# Patient Record
Sex: MALE | Race: OTHER | URBAN - METROPOLITAN AREA
[De-identification: names, ages, dates, MRNs, and addresses within clinical notes are randomized per-mention and may not be internally consistent; named-entity substitution may affect disease eponyms.]

---

## 2018-08-20 ENCOUNTER — OFFICE VISIT (OUTPATIENT)
Dept: FAMILY MEDICINE CLINIC | Age: 48
End: 2018-08-20

## 2018-08-20 ENCOUNTER — HOSPITAL ENCOUNTER (OUTPATIENT)
Dept: LAB | Age: 48
Discharge: HOME | End: 2018-08-20

## 2018-08-20 VITALS
TEMPERATURE: 98 F | DIASTOLIC BLOOD PRESSURE: 78 MMHG | HEIGHT: 69 IN | WEIGHT: 177 LBS | SYSTOLIC BLOOD PRESSURE: 116 MMHG | BODY MASS INDEX: 26.22 KG/M2 | HEART RATE: 61 BPM

## 2018-08-20 DIAGNOSIS — Z00.01 ENCOUNTER FOR GENERAL ADULT MEDICAL EXAMINATION WITH ABNORMAL FINDINGS: ICD-10-CM

## 2018-08-20 DIAGNOSIS — M77.12 LATERAL EPICONDYLITIS OF LEFT ELBOW: ICD-10-CM

## 2018-08-20 DIAGNOSIS — Z00.01 ENCOUNTER FOR GENERAL ADULT MEDICAL EXAMINATION WITH ABNORMAL FINDINGS: Primary | ICD-10-CM

## 2018-08-20 LAB
ALBUMIN SERPL-MCNC: 4.4 G/DL (ref 3.5–5)
ALBUMIN/GLOB SERPL: 1.4 {RATIO} (ref 1.1–2.2)
ALP SERPL-CCNC: 106 U/L (ref 45–117)
ALT SERPL-CCNC: 21 U/L (ref 12–78)
ANION GAP SERPL CALC-SCNC: 8 MMOL/L (ref 5–15)
AST SERPL-CCNC: 22 U/L (ref 15–37)
BILIRUB SERPL-MCNC: 0.4 MG/DL (ref 0.2–1)
BUN SERPL-MCNC: 21 MG/DL (ref 6–20)
BUN/CREAT SERPL: 27 (ref 12–20)
CALCIUM SERPL-MCNC: 9 MG/DL (ref 8.5–10.1)
CHLORIDE SERPL-SCNC: 104 MMOL/L (ref 97–108)
CHOLEST SERPL-MCNC: 190 MG/DL
CO2 SERPL-SCNC: 28 MMOL/L (ref 21–32)
CREAT SERPL-MCNC: 0.79 MG/DL (ref 0.7–1.3)
EST. AVERAGE GLUCOSE BLD GHB EST-MCNC: 114 MG/DL
GLOBULIN SER CALC-MCNC: 3.2 G/DL (ref 2–4)
GLUCOSE SERPL-MCNC: 79 MG/DL (ref 65–100)
HBA1C MFR BLD: 5.6 % (ref 4.2–6.3)
HDLC SERPL-MCNC: 37 MG/DL
HDLC SERPL: 5.1 {RATIO} (ref 0–5)
LDLC SERPL CALC-MCNC: 127.2 MG/DL (ref 0–100)
LIPID PROFILE,FLP: ABNORMAL
POTASSIUM SERPL-SCNC: 4.7 MMOL/L (ref 3.5–5.1)
PROT SERPL-MCNC: 7.6 G/DL (ref 6.4–8.2)
PSA SERPL-MCNC: 0.6 NG/ML (ref 0.01–4)
SODIUM SERPL-SCNC: 140 MMOL/L (ref 136–145)
TRIGL SERPL-MCNC: 129 MG/DL (ref ?–150)
VLDLC SERPL CALC-MCNC: 25.8 MG/DL

## 2018-08-20 PROCEDURE — 80053 COMPREHEN METABOLIC PANEL: CPT | Performed by: FAMILY MEDICINE

## 2018-08-20 PROCEDURE — 80061 LIPID PANEL: CPT | Performed by: FAMILY MEDICINE

## 2018-08-20 PROCEDURE — 85027 COMPLETE CBC AUTOMATED: CPT | Performed by: FAMILY MEDICINE

## 2018-08-20 PROCEDURE — 83036 HEMOGLOBIN GLYCOSYLATED A1C: CPT | Performed by: FAMILY MEDICINE

## 2018-08-20 PROCEDURE — 84153 ASSAY OF PSA TOTAL: CPT | Performed by: FAMILY MEDICINE

## 2018-08-20 RX ORDER — PREDNISONE 10 MG/1
TABLET ORAL
Qty: 21 TAB | Refills: 0 | Status: SHIPPED | OUTPATIENT
Start: 2018-08-20

## 2018-08-20 NOTE — PATIENT INSTRUCTIONS
Codo de tenista: Instrucciones de cuidado - [ Tennis Elbow: Care Instructions ]  Instrucciones de cuidado    El codo de [place] es sensibilidad o dolor en la parte externa del codo. El dolor aparece cuando el tendón se estira y se irrita por el retorcimiento repetitivo de la mano, la silva y el antebrazo. Un tendón es un tejido masha que conecta el músculo al [place]. Esta lesión es común [place] jugadores de tenis. Martinez también pueden producirla muchas actividades en las que se usan los mismos músculos. Entre los ejemplos están la jardinería, la pintura y el uso de herramientas. El codo de [place] suele sanar con descanso y tratamiento en el hogar. La atención de seguimiento es bonnie parte clave de del valle tratamiento y seguridad. Asegúrese de hacer y acudir a todas las citas, y llame a del valle médico si está teniendo problemas. También es bonnie buena idea saber los resultados de kimmie exámenes y mantener bonnie lista de los medicamentos que hugo. ¿Cómo puede cuidarse en el hogar?    · Descanse los dedos, la silva y el antebrazo. Trate de no hacer o de reducir cualquier [employer] cause dolor de codo. Es posible que tenga que descansar del valle brazo florence semanas a meses. Siga las instrucciones de del valle médico sobre cuánto tiempo descansar.     · Colóquese hielo o bonnie compresa fría en el codo florence 10 a 20 minutos cada vez. Trate de hacerlo cada 1 a 2 horas florence los siguientes 3 días (cuando esté despierto) o hasta que la hinchazón baje. Póngase un paño cabrera entre el hielo y la piel.     · Si del valle médico le keke un aparato ortopédico o tablilla (férula), úselo según las indicaciones. Un aparato ortopédico de \"contrafuerza\" es un vendaje alrededor de del valle antebrazo, david debajo del codo.  Puede aliviar la presión [employer] tendón y distribuir la fuerza en todo el brazo.     · Para ayudar a reducir la hinchazón, eleve del valle codo sobre almohadas.     · Siga las instrucciones del médico o el fisioterapeuta con respecto al ejercicio.     · Vuelva poco a poco a kimmie actividades habituales.     · Trate de prevenir el problema. Aprenda las mejores técnicas para del valle deporte. Por ejemplo, asegúrese de que la empuñadura de del valle raqueta de tenis no sea demasiado may para del valle mano. Trate de no golpear la pelota de tenis tarde florence el swing.     · Piense en preguntar a del valle empleador acerca de nuevas maneras de hacer del valle trabajo si el dolor de codo es causado por [facility] [place] [place]. Medicamentos    · Sea christina con los medicamentos. Caitie y siga todas las instrucciones de la [place]. ¨ Si el médico le recetó un analgésico (medicamento para el dolor), tómelo según las indicaciones. ¨ Si no está tomando un analgésico recetado, pregúntele a del valle médico si puede jayashree jillian de [employer]. ¿Cuándo debe pedir ayuda? Llame a del valle médico ahora mismo o busque atención médica inmediata si:    · El dolor empeora.     · No puede doblar el codo normalmente.     · El brazo o la mano están fríos, pálidos o cambian de color.     · Siente hormigueo, debilitamiento o entumecimiento en la mano y los dedos.    Preste especial atención a los cambios en del valle nicole y asegúrese de comunicarse con del valle médico si:    · El dolor de codo le causa problemas en el [place].     · El dolor no mejora después de 2 semanas. ¿Dónde puede encontrar más información en inglés? Elba Moreno a http://verna.info/. Escriba C285 en la búsqueda para aprender más acerca de \"Codo de tenista: Instrucciones de cuidado - [ Tennis Elbow: Care Instructions ]. \"  Revisado: 29 noviembre, 2017  Versión del contenido: 11.7  © 9035-7543 [employer], [employer]. Las instrucciones de cuidado fueron adaptadas bajo licencia por [employer] (which disclaims liability or warranty for this information). Si usted tiene [place] afección médica o sobre estas instrucciones, siempre pregunte a del valle profesional de nicole.  [employer], [employer] niega toda garantía o responsabilidad por del valle uso de esta información.

## 2018-08-20 NOTE — PROGRESS NOTES
Avs discussed with [address] [place] [address] by Discharge Nurse Kimmy Sykes LPN with  Silvio Banks. Discussed medications prescribed today, pt verbalized understanding of the tapering of the medications. Pt advised he will get call regarding lab results, if doesn't hear any thing he can call the number provided to the [facility].    AVS printed and given to patient Kimmy Sykes LPN

## 2018-08-20 NOTE — PROGRESS NOTES
HISTORY OF PRESENT ILLNESS  Parag Chang is a 57 y.o. male. HPI  Patient is having left elbow pain for the past month. It hurst the whole elbow and radiates to the lateral aspect of the elbow and runs down the forearm. So far has been taking ibuprofen 600 mg twice a day. No injury to the elbow. No falls. He is a . Also would like to have blood work today to make sure everything is well. Review of Systems   Constitutional: Negative for chills, diaphoresis, fever, malaise/fatigue and weight loss. HENT: Negative for congestion, ear discharge, ear pain, hearing loss, nosebleeds, sinus pain, sore throat and tinnitus. Eyes: Negative for blurred vision, double vision, photophobia, pain, discharge and redness. Respiratory: Negative for cough, hemoptysis, sputum production, shortness of breath, wheezing and stridor. Cardiovascular: Negative for chest pain, palpitations, orthopnea, claudication, leg swelling and PND. Gastrointestinal: Negative for abdominal pain, diarrhea, heartburn, melena, nausea and vomiting. Genitourinary: Negative for dysuria, flank pain, frequency, hematuria and urgency. Musculoskeletal: Positive for joint pain and myalgias. Negative for back pain, falls and neck pain. Left elbow pain   Skin: Negative for itching and rash. Neurological: Negative for dizziness, tingling, tremors, sensory change, weakness and headaches. History reviewed. No pertinent past medical history. History reviewed. No pertinent surgical history. Social History     Social History    Marital status:      Spouse name: N/A    Number of children: N/A    Years of education: N/A     Social History Main Topics    Smoking status: Never Smoker    Smokeless tobacco: Never Used    Alcohol use Yes      Comment: social    Drug use: No    Sexual activity: Not Asked     Other Topics Concern    None     Social History Narrative    None       History reviewed.  No pertinent family history. Current Outpatient Prescriptions   Medication Sig Dispense Refill    predniSONE (DELTASONE) 10 mg tablet 6 tab po day1, 5 tab, po day 2, 4 tab po day 3, 3 tab po day 4, 2 tab po day 5, 1 tab po day 6 21 Tab 0       No Known Allergies  /78 (BP 1 Location: Right arm, BP Patient Position: Sitting)  Pulse 61  Temp 98 °F (36.7 °C) (Oral)   Ht 5' 8.9\" (1.75 m)  Wt 177 lb (80.3 kg)  BMI 26.22 kg/m2  Physical Exam   Constitutional: He appears well-developed and well-nourished. HENT:   Head: Normocephalic. Mouth/Throat: No oropharyngeal exudate. Eyes: Conjunctivae and EOM are normal. Pupils are equal, round, and reactive to light. Neck: Normal range of motion. Neck supple. No thyromegaly present. Cardiovascular: Normal rate, regular rhythm and normal heart sounds. No murmur heard. Pulmonary/Chest: Effort normal and breath sounds normal. No respiratory distress. He has no wheezes. He has no rales. Abdominal: Soft. Bowel sounds are normal. He exhibits no distension. There is no tenderness. There is no rebound. Musculoskeletal: Normal range of motion. He exhibits tenderness. He exhibits no edema. Tenderness to palpation left lateral epicondyle       ASSESSMENT and PLAN  Diagnoses and all orders for this visit:    1. Encounter for general adult medical examination with abnormal findings  -     METABOLIC PANEL, COMPREHENSIVE; Future  -     CBC W/O DIFF; Future  -     LIPID PANEL; Future  -     HEMOGLOBIN A1C WITH EAG; Future  -     PROSTATE SPECIFIC AG; Future    2.  Lateral epicondylitis of left elbow    Other orders  -     predniSONE (DELTASONE) 10 mg tablet; 6 tab po day1, 5 tab, po day 2, 4 tab po day 3, 3 tab po day 4, 2 tab po day 5, 1 tab po day 6

## 2018-08-21 LAB
ERYTHROCYTE [DISTWIDTH] IN BLOOD BY AUTOMATED COUNT: 13.2 % (ref 11.5–14.5)
HCT VFR BLD AUTO: 50.6 % (ref 36.6–50.3)
HGB BLD-MCNC: 15.9 G/DL (ref 12.1–17)
MCH RBC QN AUTO: 28.3 PG (ref 26–34)
MCHC RBC AUTO-ENTMCNC: 31.4 G/DL (ref 30–36.5)
MCV RBC AUTO: 90.2 FL (ref 80–99)
NRBC # BLD: 0 K/UL (ref 0–0.01)
NRBC BLD-RTO: 0 PER 100 WBC
PLATELET # BLD AUTO: 238 K/UL (ref 150–400)
PMV BLD AUTO: 11.6 FL (ref 8.9–12.9)
RBC # BLD AUTO: 5.61 M/UL (ref 4.1–5.7)
WBC # BLD AUTO: 7.5 K/UL (ref 4.1–11.1)

## 2018-08-22 ENCOUNTER — TELEPHONE (OUTPATIENT)
Dept: FAMILY MEDICINE CLINIC | Age: 48
End: 2018-08-22

## 2018-08-22 NOTE — LETTER
8/23/2018 3:36 PM 
 
Mr. Marni Cohen [address] [place] [address] [phone] Dear Terese Miky Becker, Los analisis de winnie que hicimos cuando usted vino a [place] son normal.  
 
 
 
 
 
 
Sincerely, Michelle Chrsitina MD

## 2018-08-22 NOTE — TELEPHONE ENCOUNTER
Normal blood count, kidney, liver, electrolytes, prostate test in blood, cholesterol. No diabetes.   Patient can be informed

## 2023-02-07 ENCOUNTER — TRANSCRIBE ORDER (OUTPATIENT)
Dept: SCHEDULING | Age: 53
End: 2023-02-07

## 2023-02-07 DIAGNOSIS — R10.9 STOMACH ACHE: Primary | ICD-10-CM

## 2023-02-07 DIAGNOSIS — R10.9 FLANK PAIN: ICD-10-CM

## 2023-02-21 ENCOUNTER — HOSPITAL ENCOUNTER (OUTPATIENT)
Dept: ULTRASOUND IMAGING | Age: 53
Discharge: HOME | End: 2023-02-21
Attending: INTERNAL MEDICINE

## 2023-02-21 DIAGNOSIS — R10.9 FLANK PAIN: ICD-10-CM

## 2023-02-21 PROCEDURE — 76770 US EXAM ABDO BACK WALL COMP: CPT

## 2023-05-20 RX ORDER — PREDNISONE 10 MG/1
TABLET ORAL
COMMUNITY
Start: 2018-08-20

## 2024-02-09 ENCOUNTER — HOSPITAL ENCOUNTER (INPATIENT)
Facility: HOSPITAL | Age: 54
LOS: 1 days | Discharge: HOME | DRG: 390 | End: 2024-02-11
Attending: EMERGENCY MEDICINE | Admitting: FAMILY MEDICINE

## 2024-02-09 ENCOUNTER — APPOINTMENT (OUTPATIENT)
Facility: HOSPITAL | Age: 54
DRG: 390 | End: 2024-02-09

## 2024-02-09 DIAGNOSIS — N20.0 LEFT RENAL STONE: ICD-10-CM

## 2024-02-09 DIAGNOSIS — R80.8 OTHER PROTEINURIA: Primary | ICD-10-CM

## 2024-02-09 DIAGNOSIS — K56.609 SMALL BOWEL OBSTRUCTION (HCC): ICD-10-CM

## 2024-02-09 DIAGNOSIS — K76.0 HEPATIC STEATOSIS: ICD-10-CM

## 2024-02-09 LAB
ALBUMIN SERPL-MCNC: 4.1 G/DL (ref 3.5–5)
ALBUMIN/GLOB SERPL: 1.2 (ref 1.1–2.2)
ALP SERPL-CCNC: 103 U/L (ref 45–117)
ALT SERPL-CCNC: 20 U/L (ref 12–78)
ANION GAP SERPL CALC-SCNC: 0 MMOL/L (ref 5–15)
APPEARANCE UR: CLEAR
AST SERPL-CCNC: 21 U/L (ref 15–37)
BACTERIA URNS QL MICRO: ABNORMAL /HPF
BASOPHILS # BLD: 0 K/UL (ref 0–0.1)
BASOPHILS NFR BLD: 0 % (ref 0–1)
BILIRUB SERPL-MCNC: 0.6 MG/DL (ref 0.2–1)
BILIRUB UR QL CFM: POSITIVE
BUN SERPL-MCNC: 24 MG/DL (ref 6–20)
BUN/CREAT SERPL: 25 (ref 12–20)
CALCIUM SERPL-MCNC: 9.1 MG/DL (ref 8.5–10.1)
CHLORIDE SERPL-SCNC: 111 MMOL/L (ref 97–108)
CO2 SERPL-SCNC: 28 MMOL/L (ref 21–32)
COLOR UR: ABNORMAL
COMMENT:: NORMAL
CREAT SERPL-MCNC: 0.95 MG/DL (ref 0.7–1.3)
DIFFERENTIAL METHOD BLD: ABNORMAL
EOSINOPHIL # BLD: 0.1 K/UL (ref 0–0.4)
EOSINOPHIL NFR BLD: 1 % (ref 0–7)
EPITH CASTS URNS QL MICRO: ABNORMAL /LPF
ERYTHROCYTE [DISTWIDTH] IN BLOOD BY AUTOMATED COUNT: 13.7 % (ref 11.5–14.5)
GLOBULIN SER CALC-MCNC: 3.5 G/DL (ref 2–4)
GLUCOSE SERPL-MCNC: 109 MG/DL (ref 65–100)
GLUCOSE UR STRIP.AUTO-MCNC: NEGATIVE MG/DL
HCT VFR BLD AUTO: 49.4 % (ref 36.6–50.3)
HGB BLD-MCNC: 16.3 G/DL (ref 12.1–17)
HGB UR QL STRIP: NEGATIVE
IMM GRANULOCYTES # BLD AUTO: 0 K/UL (ref 0–0.04)
IMM GRANULOCYTES NFR BLD AUTO: 0 % (ref 0–0.5)
KETONES UR QL STRIP.AUTO: ABNORMAL MG/DL
LEUKOCYTE ESTERASE UR QL STRIP.AUTO: NEGATIVE
LIPASE SERPL-CCNC: 23 U/L (ref 13–75)
LYMPHOCYTES # BLD: 2 K/UL (ref 0.8–3.5)
LYMPHOCYTES NFR BLD: 26 % (ref 12–49)
MCH RBC QN AUTO: 27.8 PG (ref 26–34)
MCHC RBC AUTO-ENTMCNC: 33 G/DL (ref 30–36.5)
MCV RBC AUTO: 84.3 FL (ref 80–99)
MONOCYTES # BLD: 0.5 K/UL (ref 0–1)
MONOCYTES NFR BLD: 7 % (ref 5–13)
NEUTS SEG # BLD: 5.1 K/UL (ref 1.8–8)
NEUTS SEG NFR BLD: 66 % (ref 32–75)
NITRITE UR QL STRIP.AUTO: NEGATIVE
NRBC # BLD: 0 K/UL (ref 0–0.01)
NRBC BLD-RTO: 0 PER 100 WBC
PH UR STRIP: 5.5 (ref 5–8)
PLATELET # BLD AUTO: 261 K/UL (ref 150–400)
PMV BLD AUTO: 10.4 FL (ref 8.9–12.9)
POTASSIUM SERPL-SCNC: 3.8 MMOL/L (ref 3.5–5.1)
PROT SERPL-MCNC: 7.6 G/DL (ref 6.4–8.2)
PROT UR STRIP-MCNC: 30 MG/DL
RBC # BLD AUTO: 5.86 M/UL (ref 4.1–5.7)
RBC #/AREA URNS HPF: ABNORMAL /HPF (ref 0–5)
SODIUM SERPL-SCNC: 139 MMOL/L (ref 136–145)
SPECIMEN HOLD: NORMAL
SPERM URNS QL MICRO: PRESENT
URINE CULTURE IF INDICATED: ABNORMAL
UROBILINOGEN UR QL STRIP.AUTO: 0.2 EU/DL (ref 0.2–1)
WBC # BLD AUTO: 7.7 K/UL (ref 4.1–11.1)
WBC URNS QL MICRO: ABNORMAL /HPF (ref 0–4)

## 2024-02-09 PROCEDURE — 81001 URINALYSIS AUTO W/SCOPE: CPT

## 2024-02-09 PROCEDURE — 80053 COMPREHEN METABOLIC PANEL: CPT

## 2024-02-09 PROCEDURE — 99221 1ST HOSP IP/OBS SF/LOW 40: CPT | Performed by: SURGERY

## 2024-02-09 PROCEDURE — 36415 COLL VENOUS BLD VENIPUNCTURE: CPT

## 2024-02-09 PROCEDURE — 96374 THER/PROPH/DIAG INJ IV PUSH: CPT

## 2024-02-09 PROCEDURE — 96372 THER/PROPH/DIAG INJ SC/IM: CPT

## 2024-02-09 PROCEDURE — 6360000002 HC RX W HCPCS: Performed by: NURSE PRACTITIONER

## 2024-02-09 PROCEDURE — 99285 EMERGENCY DEPT VISIT HI MDM: CPT

## 2024-02-09 PROCEDURE — 83690 ASSAY OF LIPASE: CPT

## 2024-02-09 PROCEDURE — 85025 COMPLETE CBC W/AUTO DIFF WBC: CPT

## 2024-02-09 PROCEDURE — 2580000003 HC RX 258: Performed by: NURSE PRACTITIONER

## 2024-02-09 PROCEDURE — A4216 STERILE WATER/SALINE, 10 ML: HCPCS | Performed by: NURSE PRACTITIONER

## 2024-02-09 PROCEDURE — 74176 CT ABD & PELVIS W/O CONTRAST: CPT

## 2024-02-09 PROCEDURE — C9113 INJ PANTOPRAZOLE SODIUM, VIA: HCPCS | Performed by: NURSE PRACTITIONER

## 2024-02-09 RX ORDER — DICYCLOMINE HYDROCHLORIDE 10 MG/ML
20 INJECTION INTRAMUSCULAR
Status: COMPLETED | OUTPATIENT
Start: 2024-02-09 | End: 2024-02-09

## 2024-02-09 RX ORDER — SODIUM CHLORIDE 9 MG/ML
INJECTION, SOLUTION INTRAVENOUS ONCE
Status: COMPLETED | OUTPATIENT
Start: 2024-02-09 | End: 2024-02-10

## 2024-02-09 RX ADMIN — SODIUM CHLORIDE: 9 INJECTION, SOLUTION INTRAVENOUS at 20:02

## 2024-02-09 RX ADMIN — DICYCLOMINE HYDROCHLORIDE 20 MG: 10 INJECTION, SOLUTION INTRAMUSCULAR at 19:22

## 2024-02-09 RX ADMIN — SODIUM CHLORIDE 40 MG: 9 INJECTION INTRAMUSCULAR; INTRAVENOUS; SUBCUTANEOUS at 19:22

## 2024-02-09 NOTE — ED TRIAGE NOTES
Patient ambulatory through triage with complaints of generalized abd cramping. Hx of acid reflux, he reports that his Omeprazole stopped working.

## 2024-02-09 NOTE — ED PROVIDER NOTES
[facility] EMERGENCY DEP  EMERGENCY DEPARTMENT ENCOUNTER      Pt Name: Martinez Whitaker  MRN: [id number]  Birthdate 1970  Date of evaluation: 2/9/2024  Provider: CHATA Zarco NP    CHIEF COMPLAINT       Chief Complaint   Patient presents with    Abdominal Pain         HISTORY OF PRESENT ILLNESS   (Location/Symptom, Timing/Onset, Context/Setting, Quality, Duration, Modifying Factors, Severity)  Note limiting factors.   54 y/o male with HX sleep apnea and GERD presents ambulatory to the ER for evaluation of:  -Mid abdominal pain, cramp sensation currently, bilateral flank pain and diarrhea started 2 days ago, last good BM 3 days ago. Denies blood in urine or stool.   -Started having increased acid reflux 2 months ago started all this, taking Omeprazole without relief of symptoms.   Denies any surgeries on the abdominal.  Denies fever, cough, CP, SOB, N/V, hematochezia, denies difficulty started his stream.      used for initial assessment.    The history is provided by the patient. The history is limited by a language barrier. A  was used (Interprter used for initial assessment.).         Review of External Medical Records:     Nursing Notes were reviewed.    REVIEW OF SYSTEMS    (2-9 systems for level 4, 10 or more for level 5)     Review of Systems    Except as noted above the remainder of the review of systems was reviewed and negative.       PAST MEDICAL HISTORY   No past medical history on file.      SURGICAL HISTORY     No past surgical history on file.      CURRENT MEDICATIONS       Previous Medications    PREDNISONE (DELTASONE) 10 MG TABLET    6 tab po day1, 5 tab, po day 2, 4 tab po day 3, 3 tab po day 4, 2 tab po day 5, 1 tab po day 6       ALLERGIES     Patient has no known allergies.    FAMILY HISTORY     No family history on file.       SOCIAL HISTORY       Social History     Socioeconomic History    Marital status:    Tobacco Use

## 2024-02-10 ENCOUNTER — APPOINTMENT (OUTPATIENT)
Facility: HOSPITAL | Age: 54
DRG: 390 | End: 2024-02-10

## 2024-02-10 PROBLEM — K56.609 SBO (SMALL BOWEL OBSTRUCTION) (HCC): Status: ACTIVE | Noted: 2024-02-10

## 2024-02-10 LAB
ANION GAP SERPL CALC-SCNC: 3 MMOL/L (ref 5–15)
BASOPHILS # BLD: 0 K/UL (ref 0–0.1)
BASOPHILS NFR BLD: 0 % (ref 0–1)
BUN SERPL-MCNC: 25 MG/DL (ref 6–20)
BUN/CREAT SERPL: 30 (ref 12–20)
CALCIUM SERPL-MCNC: 9 MG/DL (ref 8.5–10.1)
CHLORIDE SERPL-SCNC: 110 MMOL/L (ref 97–108)
CO2 SERPL-SCNC: 26 MMOL/L (ref 21–32)
COMMENT:: NORMAL
CREAT SERPL-MCNC: 0.83 MG/DL (ref 0.7–1.3)
DIFFERENTIAL METHOD BLD: NORMAL
EOSINOPHIL # BLD: 0.1 K/UL (ref 0–0.4)
EOSINOPHIL NFR BLD: 1 % (ref 0–7)
ERYTHROCYTE [DISTWIDTH] IN BLOOD BY AUTOMATED COUNT: 13.6 % (ref 11.5–14.5)
GLUCOSE SERPL-MCNC: 109 MG/DL (ref 65–100)
HCT VFR BLD AUTO: 48.1 % (ref 36.6–50.3)
HGB BLD-MCNC: 15.9 G/DL (ref 12.1–17)
IMM GRANULOCYTES # BLD AUTO: 0 K/UL (ref 0–0.04)
IMM GRANULOCYTES NFR BLD AUTO: 0 % (ref 0–0.5)
LYMPHOCYTES # BLD: 2 K/UL (ref 0.8–3.5)
LYMPHOCYTES NFR BLD: 26 % (ref 12–49)
MAGNESIUM SERPL-MCNC: 2.3 MG/DL (ref 1.6–2.4)
MCH RBC QN AUTO: 28.2 PG (ref 26–34)
MCHC RBC AUTO-ENTMCNC: 33.1 G/DL (ref 30–36.5)
MCV RBC AUTO: 85.4 FL (ref 80–99)
MONOCYTES # BLD: 0.6 K/UL (ref 0–1)
MONOCYTES NFR BLD: 8 % (ref 5–13)
NEUTS SEG # BLD: 5 K/UL (ref 1.8–8)
NEUTS SEG NFR BLD: 65 % (ref 32–75)
NRBC # BLD: 0 K/UL (ref 0–0.01)
NRBC BLD-RTO: 0 PER 100 WBC
PLATELET # BLD AUTO: 254 K/UL (ref 150–400)
PMV BLD AUTO: 10.9 FL (ref 8.9–12.9)
POTASSIUM SERPL-SCNC: 3.4 MMOL/L (ref 3.5–5.1)
RBC # BLD AUTO: 5.63 M/UL (ref 4.1–5.7)
SODIUM SERPL-SCNC: 139 MMOL/L (ref 136–145)
SPECIMEN HOLD: NORMAL
WBC # BLD AUTO: 7.7 K/UL (ref 4.1–11.1)

## 2024-02-10 PROCEDURE — 36415 COLL VENOUS BLD VENIPUNCTURE: CPT

## 2024-02-10 PROCEDURE — 99231 SBSQ HOSP IP/OBS SF/LOW 25: CPT | Performed by: SURGERY

## 2024-02-10 PROCEDURE — 74019 RADEX ABDOMEN 2 VIEWS: CPT

## 2024-02-10 PROCEDURE — 6360000002 HC RX W HCPCS: Performed by: FAMILY MEDICINE

## 2024-02-10 PROCEDURE — 80048 BASIC METABOLIC PNL TOTAL CA: CPT

## 2024-02-10 PROCEDURE — C9113 INJ PANTOPRAZOLE SODIUM, VIA: HCPCS | Performed by: FAMILY MEDICINE

## 2024-02-10 PROCEDURE — 2580000003 HC RX 258: Performed by: FAMILY MEDICINE

## 2024-02-10 PROCEDURE — 1200000000 HC SEMI PRIVATE

## 2024-02-10 PROCEDURE — 83735 ASSAY OF MAGNESIUM: CPT

## 2024-02-10 PROCEDURE — 85025 COMPLETE CBC W/AUTO DIFF WBC: CPT

## 2024-02-10 PROCEDURE — 6360000002 HC RX W HCPCS: Performed by: NURSE PRACTITIONER

## 2024-02-10 PROCEDURE — 2500000003 HC RX 250 WO HCPCS: Performed by: FAMILY MEDICINE

## 2024-02-10 RX ORDER — ONDANSETRON 2 MG/ML
4 INJECTION INTRAMUSCULAR; INTRAVENOUS EVERY 6 HOURS PRN
Status: DISCONTINUED | OUTPATIENT
Start: 2024-02-10 | End: 2024-02-11 | Stop reason: HOSPADM

## 2024-02-10 RX ORDER — MAGNESIUM SULFATE IN WATER 40 MG/ML
2000 INJECTION, SOLUTION INTRAVENOUS PRN
Status: DISCONTINUED | OUTPATIENT
Start: 2024-02-10 | End: 2024-02-11 | Stop reason: HOSPADM

## 2024-02-10 RX ORDER — POTASSIUM CHLORIDE 7.45 MG/ML
10 INJECTION INTRAVENOUS
Status: COMPLETED | OUTPATIENT
Start: 2024-02-10 | End: 2024-02-10

## 2024-02-10 RX ORDER — ONDANSETRON 4 MG/1
4 TABLET, ORALLY DISINTEGRATING ORAL EVERY 8 HOURS PRN
Status: DISCONTINUED | OUTPATIENT
Start: 2024-02-10 | End: 2024-02-11 | Stop reason: HOSPADM

## 2024-02-10 RX ORDER — SODIUM CHLORIDE 0.9 % (FLUSH) 0.9 %
5-40 SYRINGE (ML) INJECTION EVERY 12 HOURS SCHEDULED
Status: DISCONTINUED | OUTPATIENT
Start: 2024-02-10 | End: 2024-02-11 | Stop reason: HOSPADM

## 2024-02-10 RX ORDER — HYDROMORPHONE HYDROCHLORIDE 1 MG/ML
0.5 INJECTION, SOLUTION INTRAMUSCULAR; INTRAVENOUS; SUBCUTANEOUS EVERY 4 HOURS PRN
Status: DISCONTINUED | OUTPATIENT
Start: 2024-02-10 | End: 2024-02-11 | Stop reason: HOSPADM

## 2024-02-10 RX ORDER — ACETAMINOPHEN 650 MG/1
650 SUPPOSITORY RECTAL EVERY 6 HOURS PRN
Status: DISCONTINUED | OUTPATIENT
Start: 2024-02-10 | End: 2024-02-11 | Stop reason: HOSPADM

## 2024-02-10 RX ORDER — POTASSIUM CHLORIDE 7.45 MG/ML
10 INJECTION INTRAVENOUS PRN
Status: DISCONTINUED | OUTPATIENT
Start: 2024-02-10 | End: 2024-02-11 | Stop reason: HOSPADM

## 2024-02-10 RX ORDER — SODIUM CHLORIDE 0.9 % (FLUSH) 0.9 %
5-40 SYRINGE (ML) INJECTION PRN
Status: DISCONTINUED | OUTPATIENT
Start: 2024-02-10 | End: 2024-02-11 | Stop reason: HOSPADM

## 2024-02-10 RX ORDER — SODIUM CHLORIDE 9 MG/ML
INJECTION, SOLUTION INTRAVENOUS PRN
Status: DISCONTINUED | OUTPATIENT
Start: 2024-02-10 | End: 2024-02-11 | Stop reason: HOSPADM

## 2024-02-10 RX ORDER — POLYETHYLENE GLYCOL 3350 17 G/17G
17 POWDER, FOR SOLUTION ORAL DAILY PRN
Status: DISCONTINUED | OUTPATIENT
Start: 2024-02-10 | End: 2024-02-11 | Stop reason: HOSPADM

## 2024-02-10 RX ORDER — DEXTROSE MONOHYDRATE, SODIUM CHLORIDE, AND POTASSIUM CHLORIDE 50; 1.49; 4.5 G/1000ML; G/1000ML; G/1000ML
INJECTION, SOLUTION INTRAVENOUS CONTINUOUS
Status: DISCONTINUED | OUTPATIENT
Start: 2024-02-10 | End: 2024-02-11 | Stop reason: HOSPADM

## 2024-02-10 RX ORDER — ACETAMINOPHEN 325 MG/1
650 TABLET ORAL EVERY 6 HOURS PRN
Status: DISCONTINUED | OUTPATIENT
Start: 2024-02-10 | End: 2024-02-11 | Stop reason: HOSPADM

## 2024-02-10 RX ORDER — POTASSIUM CHLORIDE 750 MG/1
40 TABLET, FILM COATED, EXTENDED RELEASE ORAL PRN
Status: DISCONTINUED | OUTPATIENT
Start: 2024-02-10 | End: 2024-02-11 | Stop reason: HOSPADM

## 2024-02-10 RX ADMIN — HYDROMORPHONE HYDROCHLORIDE 0.5 MG: 1 INJECTION, SOLUTION INTRAMUSCULAR; INTRAVENOUS; SUBCUTANEOUS at 01:30

## 2024-02-10 RX ADMIN — POTASSIUM CHLORIDE, DEXTROSE MONOHYDRATE AND SODIUM CHLORIDE: 150; 5; 450 INJECTION, SOLUTION INTRAVENOUS at 01:30

## 2024-02-10 RX ADMIN — POTASSIUM CHLORIDE 10 MEQ: 10 INJECTION, SOLUTION INTRAVENOUS at 12:10

## 2024-02-10 RX ADMIN — POTASSIUM CHLORIDE 10 MEQ: 10 INJECTION, SOLUTION INTRAVENOUS at 10:14

## 2024-02-10 RX ADMIN — SODIUM CHLORIDE, PRESERVATIVE FREE 40 MG: 5 INJECTION INTRAVENOUS at 08:15

## 2024-02-10 RX ADMIN — POTASSIUM CHLORIDE 10 MEQ: 10 INJECTION, SOLUTION INTRAVENOUS at 12:54

## 2024-02-10 RX ADMIN — SODIUM CHLORIDE, PRESERVATIVE FREE 10 ML: 5 INJECTION INTRAVENOUS at 21:18

## 2024-02-10 RX ADMIN — ONDANSETRON 4 MG: 2 INJECTION INTRAMUSCULAR; INTRAVENOUS at 01:39

## 2024-02-10 RX ADMIN — POTASSIUM CHLORIDE 10 MEQ: 10 INJECTION, SOLUTION INTRAVENOUS at 08:15

## 2024-02-10 NOTE — PROGRESS NOTES
Hospitalist Progress Note  CHATA Olson NP  Answering service: [phone] OR [phone] from in house phone        Date of Service:  2/10/2024  NAME:  Martinez Whitaker  :  1970  MRN:  [id number]      Admission Summary:   Per H&P, Martinez Whitaker is a 53 y.o. male with a pmhx sleep apnea, and GERD who presents with two days o f abdominal cramping and diarrhea, and is being admitted for SBO.  He does endorse sick contacts with similar symptoms.  This presentation was preceded by several months of worsening GERD and generalized abdominal discomfort, and decreased appetite.  He was started on PPI.  He denies past surgery     In the ED, VSS.  Labs were grossly unremarkable.  CT abdomen/pelvis showed small bowel obstruction with transition point in the right mid abdomen at the level of the ileum with discrete bowel wall thickening or mass at the level of the transition point suggesting adhesion.  Nephrolithiasis.       In the ED, he received IV Protonix, Bentyl, and normal saline infusion.  General surgery was consulted, and recommends bowel rest with IV fluids, pain control.  NG tube to be placed if patient has persistent vomiting       Interval history / Subjective:   I saw the patient this morning on rounds.  Has been passing flatus however no bowel movement.  Denies pain, nausea.     Assessment & Plan:         SBO  As seen on CT  Has been seen by general surgery, appreciate recommendations  Clinical history more consistent with gastroenteritis  Repeat KUB this morning has been done  Continuing antiemetics  Patient doing well, I did discuss case with general surgery  Starting on clear liquid diet and will advance as tolerated           GERD  Continuing PPI          Code status: Full  Prophylaxis: SCD  Care Plan discussed with: Patient, nurse  Anticipated Disposition: Likely discharge in a couple of days

## 2024-02-10 NOTE — CONSULTS
General Surgery Consult Note            Date:2/9/2024        Patient Name:Martinez Whitaker     YOB: 1970     Age:53 y.o.    Reason for Consult: Possible bowel obstruction        History of Present Illness   The patient is a 53-year-old male who began having abdominal pain several days ago.  He states that he then developed diarrhea yesterday.  Today he began having sharper abdominal pain with some nausea without vomiting.  States that he normally has a history of reflux but this felt much worse.  Of note 1 family member last week had something similar and was diagnosed with a stomach bug and another family member was also diagnosed several days ago with something similar and got better.  Neither of them had at this bad.  He taken any medication.  He has no history of surgery.     Past Medical History   No past medical history on file.     Past Surgical History   No past surgical history on file.     Medications     Prior to Admission medications    Medication Sig Start Date End Date Taking? Authorizing Provider   predniSONE (DELTASONE) 10 MG tablet 6 tab po day1, 5 tab, po day 2, 4 tab po day 3, 3 tab po day 4, 2 tab po day 5, 1 tab po day 6 8/20/18   Automatic Reconciliation, Ar        No current facility-administered medications for this encounter.      Allergies   Patient has no known allergies.    Social History     Social History       Tobacco History       Smoking Status  Never      Smokeless Tobacco Use  Never              Alcohol History       Alcohol Use Status  Yes              Drug Use       Drug Use Status  No              Sexual Activity       Sexually Active  Not Asked                    Family History   No family history on file.    Review of Systems   Negative except as noted in HPI    Physical Exam   /88   Pulse 90   Temp 98.8 °F (37.1 °C) (Oral)   Resp 16   Ht 1.727 m (5' 8\")   Wt 85 kg (187 lb 6.3 oz)   SpO2 100%   BMI 28.49 kg/m²     CONSTITUTIONAL: Alert and

## 2024-02-10 NOTE — PROGRESS NOTES
Interpreted for patient and Dr. Orellana during assessment.    Julissa Garcia [facility]   [phone]

## 2024-02-10 NOTE — PROGRESS NOTES
Progress Note  Date:2/10/2024       Room:[facility]  Patient Name:Martinez Whitaker     YOB: 1970     Age:53 y.o.        Subjective    Subjective   Review of Systems  Patient feeling good today.  Has been passing gas and had a bowel movement.  No longer has any nausea.  Objective         Vitals Last 24 Hours:  TEMPERATURE:  Temp  Av.4 °F (36.9 °C)  Min: 97.9 °F (36.6 °C)  Max: 98.9 °F (37.2 °C)  RESPIRATIONS RANGE: Resp  Av.2  Min: 16  Max: 20  PULSE OXIMETRY RANGE: SpO2  Av.8 %  Min: 97 %  Max: 100 %  PULSE RANGE: Pulse  Av.2  Min: 59  Max: 90  BLOOD PRESSURE RANGE: Systolic (24hrs), Av , Min:111 , Max:132   ; Diastolic (24hrs), Av, Min:69, Max:88    I/O (24Hr):  No intake or output data in the 24 hours ending 02/10/24 1201  Objective:  Vital signs: (most recent): Blood pressure 126/86, pulse 60, temperature 97.9 °F (36.6 °C), temperature source Oral, resp. rate 20, height 1.727 m (5' 8\"), weight 85 kg (187 lb 6.3 oz), SpO2 98 %.    General alert no acute distress  Lungs clear  Heart regular rate and rhythm  Abdomen soft nontender nondistended  Labs/Imaging/Diagnostics    Labs:  CBC:  Recent Labs     24  1704 02/10/24  0119   WBC 7.7 7.7   RBC 5.86* 5.63   HGB 16.3 15.9   HCT 49.4 48.1   MCV 84.3 85.4   RDW 13.7 13.6    254     CHEMISTRIES:  Recent Labs     24  1704 02/10/24  0119    139   K 3.8 3.4*   * 110*   CO2 28 26   BUN 24* 25*   CREATININE 0.95 0.83   GLUCOSE 109* 109*   MG  --  2.3     PT/INR:No results for input(s): \"PROTIME\", \"INR\" in the last 72 hours.  APTT:No results for input(s): \"APTT\" in the last 72 hours.  LIVER PROFILE:  Recent Labs     24  1704   AST 21   ALT 20   BILITOT 0.6   ALKPHOS 103       Imaging Last 24 Hours:  XR ABDOMEN (2 VIEWS)    Result Date: 2/10/2024  EXAM:  XR ABDOMEN (2 VIEWS) INDICATION: Small bowel obstruction COMPARISON: CT abdomen pelvis from 2024 TECHNIQUE: Upright and supine abdomen views.

## 2024-02-10 NOTE — H&P
History and Physical    Date of Service:  2/10/2024  Primary Care Provider: Cindy Medina MD  Source of information: patient, electronic medical record    Chief Complaint: Abdominal Pain      History of Presenting Illness:   Martinez Whitaker is a 53 y.o. male with a pmhx sleep apnea, and GERD who presents with two days o f abdominal cramping and diarrhea, and is being admitted for SBO.  He does endorse sick contacts with similar symptoms.  This presentation was preceded by several months of worsening GERD and generalized abdominal discomfort, and decreased appetite.  He was started on PPI.  He denies past surgery    In the ED, VSS.  Labs were grossly unremarkable.  CT abdomen/pelvis showed small bowel obstruction with transition point in the right mid abdomen at the level of the ileum with discrete bowel wall thickening or mass at the level of the transition point suggesting adhesion.  Nephrolithiasis.      In the ED, he received IV Protonix, Bentyl, and normal saline infusion.  General surgery was consulted, and recommends bowel rest with IV fluids, pain control.  NG tube to be placed if patient has persistent vomiting     REVIEW OF SYSTEMS:  A comprehensive review of systems was negative except for that written in the History of Present Illness.     No past medical history on file.   No past surgical history on file.  Prior to Admission medications    Medication Sig Start Date End Date Taking? Authorizing Provider   predniSONE (DELTASONE) 10 MG tablet 6 tab po day1, 5 tab, po day 2, 4 tab po day 3, 3 tab po day 4, 2 tab po day 5, 1 tab po day 6 8/20/18   Automatic Reconciliation, Ar     No Known Allergies   No family history on file.   Social History:  reports that he has never smoked. He has never used smokeless tobacco. He reports current alcohol use. He reports that he does not use drugs.   Social Determinants of Health     Tobacco Use: Low Risk  (2/21/2023)    Patient History

## 2024-02-10 NOTE — ED NOTES
ED TO INPATIENT SBAR HANDOFF    Spoke with DAMIÁN Matthews 5E @ 0335--E-transfer pt going to room 500, Bed 02.    Patient Name: Martinez Whitaker   :  1970  53 y.o.   MRN:  [id number]  ED Room #:  R36/R36  Family/Caregiver Present yes   Restraints no   Sitter no   Sepsis Risk Score Sepsis Risk Score: 0.35    Situation  Code Status: Full Code     Allergies: Patient has no known allergies.  Weight: Patient Vitals for the past 96 hrs (Last 3 readings):   Weight   24 1652 85 kg (187 lb 6.3 oz)     Arrived from: home  Chief Complaint:   Chief Complaint   Patient presents with    Abdominal Pain     Hospital Problem/Diagnosis:  Principal Problem:    SBO (small bowel obstruction) (HCC)  Resolved Problems:    * No resolved hospital problems. *    Imaging:   XR ABDOMEN (2 VIEWS)   Final Result   Diffuse gaseous small bowel distention, compatible with distal small bowel   obstruction or ileus.         CT ABDOMEN PELVIS WO CONTRAST Additional Contrast? None   Final Result   1.  Small bowel obstruction with transition point in the right mid abdomen at   the level of the ileum, without discrete bowel wall thickening or mass at the   level of the transition point, suggesting an adhesion.   2.  Punctate nonobstructive left lower pole renal stone.        Abnormal labs:   Abnormal Labs Reviewed   CBC WITH AUTO DIFFERENTIAL - Abnormal; Notable for the following components:       Result Value    RBC 5.86 (*)     All other components within normal limits   COMPREHENSIVE METABOLIC PANEL - Abnormal; Notable for the following components:    Chloride 111 (*)     Anion Gap 0 (*)     Glucose 109 (*)     BUN 24 (*)     Bun/Cre Ratio 25 (*)     All other components within normal limits   URINALYSIS WITH REFLEX TO CULTURE - Abnormal; Notable for the following components:    Protein, UA 30 (*)     Ketones, Urine TRACE (*)     BACTERIA, URINE 1+ (*)     All other components within normal limits   BILIRUBIN, CONFIRMATORY - Abnormal;

## 2024-02-11 VITALS
HEIGHT: 68 IN | TEMPERATURE: 97.9 F | OXYGEN SATURATION: 100 % | BODY MASS INDEX: 28.4 KG/M2 | SYSTOLIC BLOOD PRESSURE: 106 MMHG | HEART RATE: 57 BPM | RESPIRATION RATE: 18 BRPM | DIASTOLIC BLOOD PRESSURE: 75 MMHG | WEIGHT: 187.39 LBS

## 2024-02-11 LAB
ANION GAP SERPL CALC-SCNC: 1 MMOL/L (ref 5–15)
BASOPHILS # BLD: 0 K/UL (ref 0–0.1)
BASOPHILS NFR BLD: 0 % (ref 0–1)
BUN SERPL-MCNC: 9 MG/DL (ref 6–20)
BUN/CREAT SERPL: 12 (ref 12–20)
CALCIUM SERPL-MCNC: 8.4 MG/DL (ref 8.5–10.1)
CHLORIDE SERPL-SCNC: 111 MMOL/L (ref 97–108)
CO2 SERPL-SCNC: 26 MMOL/L (ref 21–32)
CREAT SERPL-MCNC: 0.76 MG/DL (ref 0.7–1.3)
DIFFERENTIAL METHOD BLD: NORMAL
EOSINOPHIL # BLD: 0.2 K/UL (ref 0–0.4)
EOSINOPHIL NFR BLD: 5 % (ref 0–7)
ERYTHROCYTE [DISTWIDTH] IN BLOOD BY AUTOMATED COUNT: 13.6 % (ref 11.5–14.5)
GLUCOSE SERPL-MCNC: 99 MG/DL (ref 65–100)
HCT VFR BLD AUTO: 43.5 % (ref 36.6–50.3)
HGB BLD-MCNC: 13.8 G/DL (ref 12.1–17)
IMM GRANULOCYTES # BLD AUTO: 0 K/UL (ref 0–0.04)
IMM GRANULOCYTES NFR BLD AUTO: 0 % (ref 0–0.5)
LYMPHOCYTES # BLD: 2 K/UL (ref 0.8–3.5)
LYMPHOCYTES NFR BLD: 38 % (ref 12–49)
MCH RBC QN AUTO: 27.5 PG (ref 26–34)
MCHC RBC AUTO-ENTMCNC: 31.7 G/DL (ref 30–36.5)
MCV RBC AUTO: 86.8 FL (ref 80–99)
MONOCYTES # BLD: 0.4 K/UL (ref 0–1)
MONOCYTES NFR BLD: 8 % (ref 5–13)
NEUTS SEG # BLD: 2.5 K/UL (ref 1.8–8)
NEUTS SEG NFR BLD: 49 % (ref 32–75)
NRBC # BLD: 0 K/UL (ref 0–0.01)
NRBC BLD-RTO: 0 PER 100 WBC
PLATELET # BLD AUTO: 217 K/UL (ref 150–400)
PMV BLD AUTO: 10.6 FL (ref 8.9–12.9)
POTASSIUM SERPL-SCNC: 4.1 MMOL/L (ref 3.5–5.1)
RBC # BLD AUTO: 5.01 M/UL (ref 4.1–5.7)
SODIUM SERPL-SCNC: 138 MMOL/L (ref 136–145)
WBC # BLD AUTO: 5.1 K/UL (ref 4.1–11.1)

## 2024-02-11 PROCEDURE — 2580000003 HC RX 258: Performed by: FAMILY MEDICINE

## 2024-02-11 PROCEDURE — 2500000003 HC RX 250 WO HCPCS: Performed by: FAMILY MEDICINE

## 2024-02-11 PROCEDURE — 6370000000 HC RX 637 (ALT 250 FOR IP): Performed by: FAMILY MEDICINE

## 2024-02-11 PROCEDURE — C9113 INJ PANTOPRAZOLE SODIUM, VIA: HCPCS | Performed by: FAMILY MEDICINE

## 2024-02-11 PROCEDURE — A4216 STERILE WATER/SALINE, 10 ML: HCPCS | Performed by: FAMILY MEDICINE

## 2024-02-11 PROCEDURE — 36415 COLL VENOUS BLD VENIPUNCTURE: CPT

## 2024-02-11 PROCEDURE — 80048 BASIC METABOLIC PNL TOTAL CA: CPT

## 2024-02-11 PROCEDURE — 6360000002 HC RX W HCPCS: Performed by: FAMILY MEDICINE

## 2024-02-11 PROCEDURE — 85025 COMPLETE CBC W/AUTO DIFF WBC: CPT

## 2024-02-11 RX ADMIN — POTASSIUM CHLORIDE, DEXTROSE MONOHYDRATE AND SODIUM CHLORIDE: 150; 5; 450 INJECTION, SOLUTION INTRAVENOUS at 10:57

## 2024-02-11 RX ADMIN — SODIUM CHLORIDE, PRESERVATIVE FREE 40 MG: 5 INJECTION INTRAVENOUS at 08:45

## 2024-02-11 RX ADMIN — ACETAMINOPHEN 650 MG: 325 TABLET ORAL at 08:45

## 2024-02-11 RX ADMIN — SODIUM CHLORIDE, PRESERVATIVE FREE 10 ML: 5 INJECTION INTRAVENOUS at 08:48

## 2024-02-11 RX ADMIN — POTASSIUM CHLORIDE, DEXTROSE MONOHYDRATE AND SODIUM CHLORIDE: 150; 5; 450 INJECTION, SOLUTION INTRAVENOUS at 00:56

## 2024-02-11 NOTE — DISCHARGE SUMMARY
Discharge Summary       PATIENT ID: Martinez Whitaker  MRN: [id number]   YOB: 1970    DATE OF ADMISSION: 2/9/2024  5:58 PM    DATE OF DISCHARGE: 2/11/2024   PRIMARY CARE PROVIDER: Cindy Medina MD     ATTENDING PHYSICIAN: AMAURY Hanks MD  DISCHARGING PROVIDER: CHATA Olson NP    To contact this individual call [phone] and ask the  to page.  If unavailable ask to be transferred the Adult Hospitalist Department.    CONSULTATIONS: IP CONSULT TO GENERAL SURGERY  IP CONSULT TO HOSPITALIST  IP CONSULT TO SPIRITUAL SERVICES    PROCEDURES/SURGERIES: * No surgery found *     ADMITTING DIAGNOSES & HOSPITAL COURSE:   Per H&P, Martinez Whitaker is a 53 y.o. male with a pmhx sleep apnea, and GERD who presents with two days o f abdominal cramping and diarrhea, and is being admitted for SBO.  He does endorse sick contacts with similar symptoms.  This presentation was preceded by several months of worsening GERD and generalized abdominal discomfort, and decreased appetite.  He was started on PPI.  He denies past surgery     In the ED, VSS.  Labs were grossly unremarkable.  CT abdomen/pelvis showed small bowel obstruction with transition point in the right mid abdomen at the level of the ileum with discrete bowel wall thickening or mass at the level of the transition point suggesting adhesion.  Nephrolithiasis.       In the ED, he received IV Protonix, Bentyl, and normal saline infusion.  General surgery was consulted, and recommends bowel rest with IV fluids, pain control.  NG tube to be placed if patient has persistent vomiting        DISCHARGE DIAGNOSES / PLAN:          SBO  As seen on CT  Has been seen by general surgery, appreciate recommendations  Clinical history more consistent with gastroenteritis  Patient doing much better, passing flatus, has had bowel movement overnight, tolerating regular diet              GERD  Continuing PPI       PENDING TEST RESULTS:   At the time

## 2024-02-11 NOTE — DISCHARGE INSTRUCTIONS
Discharge Instructions       PATIENT ID: Martinez Whitaker  MRN: [id number]   YOB: 1970    DATE OF ADMISSION: 2/9/2024   DATE OF DISCHARGE: 2/11/2024    PRIMARY CARE PROVIDER: Cindy Medina     ATTENDING PHYSICIAN: Snehal Hanks MD   DISCHARGING PROVIDER: CHATA Olson NP    To contact this individual call [phone] and ask the  to page.   If unavailable ask to be transferred the Adult Hospitalist Department.    DISCHARGE DIAGNOSES small bowel obstruction    CONSULTATIONS: IP CONSULT TO GENERAL SURGERY  IP CONSULT TO HOSPITALIST  IP CONSULT TO SPIRITUAL SERVICES    PROCEDURES/SURGERIES: * No surgery found *    PENDING TEST RESULTS:   At the time of discharge the following test results are still pending:     FOLLOW UP APPOINTMENTS:   Cindy Medina MD Internal Medicine Follow up in 1 week(s)   [address] [facility] [address] [phone]                      ADDITIONAL CARE RECOMMENDATIONS:      DIET: regular diet        ACTIVITY: activity as tolerated     WOUND CARE: na     EQUIPMENT needed: na      DISCHARGE MEDICATIONS:   See Medication Reconciliation Form    It is important that you take the medication exactly as they are prescribed.   Keep your medication in the bottles provided by the pharmacist and keep a list of the medication names, dosages, and times to be taken in your wallet.   Do not take other medications without consulting your doctor.       NOTIFY YOUR PHYSICIAN FOR ANY OF THE FOLLOWING:   Fever over 101 degrees for 24 hours.   Chest pain, shortness of breath, fever, chills, nausea, vomiting, diarrhea, change in mentation, falling, weakness, bleeding. Severe pain or pain not relieved by medications.  Or, any other signs or symptoms that you may have questions about.      DISPOSITION:   x Home With:   OT  PT  HH  RN       SNF/Inpatient Rehab/LTAC    Independent/assisted living    Hospice    Other:           Signed:   Ministerio

## 2024-02-11 NOTE — PROGRESS NOTES
Spiritual Care Assessment/Progress Note  [facility]    Name: Martinez Whitaker MRN: [id number]    Age: 53 y.o.     Sex: male   Language: Setswana     Date: 2/11/2024            Total Time Calculated: 15 min              Spiritual Assessment begun in [facility] 5E1 SURGICAL UNIT  Service Provided For:: Patient  Referral/Consult From:: Nurse (Jessica Warren, RN)  Encounter Overview/Reason : Initial Encounter    Spiritual beliefs:      [] Involved in a aaron tradition/spiritual practice:      [] Supported by a aaron community:      [x] Claims no spiritual orientation:      [] Seeking spiritual identity:           [] Adheres to an individual form of spirituality:      [] Not able to assess:                Identified resources for coping and support system:   Support System: Children       [] Prayer                  [] Devotional reading               [] Music                  [] Guided Imagery     [] Pet visits                                        [] Other: (COMMENT)     Specific area/focus of visit   Encounter:    Crisis:    Spiritual/Emotional needs:    Ritual, Rites and Sacraments:    Grief, Loss, and Adjustments:    Ethics/Mediation:    Behavioral Health:    Palliative Care:    Advance Care Planning:      Plan/Referrals: Other (Comment) (No specific needs identified)    Narrative:     Responded to spiritual care consult from Jessica Warren, RN. Pt was lying in bed. I reviewed the chart and spoke to pt's bedside nurse for update on pt's potential needs.     Pt's daughter, Mary was present. Pt is speaks limited English and his daughter was assisting with translating. Introduced myself, role, and spiritual care services. Pt stated that he was doing okay. He shared that he believes in God, but does not identify with any specific aaron tradition. He voiced no specific spiritual or emotional needs of which I could assist. I offered words of blessing for his continued healing. Pt smiled and thanked me for

## 2024-09-03 ENCOUNTER — ANESTHESIA (OUTPATIENT)
Facility: HOSPITAL | Age: 54
End: 2024-09-03

## 2024-09-03 ENCOUNTER — HOSPITAL ENCOUNTER (OUTPATIENT)
Facility: HOSPITAL | Age: 54
Setting detail: OUTPATIENT SURGERY
Discharge: HOME | End: 2024-09-03
Attending: SPECIALIST | Admitting: SPECIALIST

## 2024-09-03 ENCOUNTER — ANESTHESIA EVENT (OUTPATIENT)
Facility: HOSPITAL | Age: 54
End: 2024-09-03

## 2024-09-03 VITALS
WEIGHT: 176.9 LBS | RESPIRATION RATE: 12 BRPM | OXYGEN SATURATION: 99 % | BODY MASS INDEX: 26.81 KG/M2 | TEMPERATURE: 97.9 F | SYSTOLIC BLOOD PRESSURE: 108 MMHG | HEIGHT: 68 IN | HEART RATE: 67 BPM | DIASTOLIC BLOOD PRESSURE: 70 MMHG

## 2024-09-03 PROCEDURE — 7100000011 HC PHASE II RECOVERY - ADDTL 15 MIN: Performed by: SPECIALIST

## 2024-09-03 PROCEDURE — 6360000002 HC RX W HCPCS

## 2024-09-03 PROCEDURE — 7100000010 HC PHASE II RECOVERY - FIRST 15 MIN: Performed by: SPECIALIST

## 2024-09-03 PROCEDURE — 2709999900 HC NON-CHARGEABLE SUPPLY: Performed by: SPECIALIST

## 2024-09-03 PROCEDURE — 3700000000 HC ANESTHESIA ATTENDED CARE: Performed by: SPECIALIST

## 2024-09-03 PROCEDURE — 3700000001 HC ADD 15 MINUTES (ANESTHESIA): Performed by: SPECIALIST

## 2024-09-03 PROCEDURE — 3600007512: Performed by: SPECIALIST

## 2024-09-03 PROCEDURE — 2720000010 HC SURG SUPPLY STERILE: Performed by: SPECIALIST

## 2024-09-03 PROCEDURE — 88305 TISSUE EXAM BY PATHOLOGIST: CPT

## 2024-09-03 PROCEDURE — 3600007502: Performed by: SPECIALIST

## 2024-09-03 PROCEDURE — 2580000003 HC RX 258

## 2024-09-03 PROCEDURE — 2500000003 HC RX 250 WO HCPCS

## 2024-09-03 RX ORDER — LIDOCAINE HYDROCHLORIDE 20 MG/ML
INJECTION, SOLUTION EPIDURAL; INFILTRATION; INTRACAUDAL; PERINEURAL PRN
Status: DISCONTINUED | OUTPATIENT
Start: 2024-09-03 | End: 2024-09-03 | Stop reason: SDUPTHER

## 2024-09-03 RX ORDER — ALBUTEROL SULFATE 90 UG/1
AEROSOL, METERED RESPIRATORY (INHALATION)
COMMUNITY
Start: 2021-02-26

## 2024-09-03 RX ORDER — SODIUM CHLORIDE 9 MG/ML
25 INJECTION, SOLUTION INTRAVENOUS PRN
Status: DISCONTINUED | OUTPATIENT
Start: 2024-09-03 | End: 2024-09-03 | Stop reason: HOSPADM

## 2024-09-03 RX ORDER — SODIUM CHLORIDE 9 MG/ML
INJECTION, SOLUTION INTRAVENOUS CONTINUOUS PRN
Status: DISCONTINUED | OUTPATIENT
Start: 2024-09-03 | End: 2024-09-03 | Stop reason: SDUPTHER

## 2024-09-03 RX ORDER — SODIUM CHLORIDE 0.9 % (FLUSH) 0.9 %
5-40 SYRINGE (ML) INJECTION EVERY 12 HOURS SCHEDULED
Status: DISCONTINUED | OUTPATIENT
Start: 2024-09-03 | End: 2024-09-03 | Stop reason: HOSPADM

## 2024-09-03 RX ORDER — SODIUM CHLORIDE 9 MG/ML
INJECTION, SOLUTION INTRAVENOUS CONTINUOUS
Status: DISCONTINUED | OUTPATIENT
Start: 2024-09-03 | End: 2024-09-03 | Stop reason: HOSPADM

## 2024-09-03 RX ORDER — SODIUM CHLORIDE 0.9 % (FLUSH) 0.9 %
5-40 SYRINGE (ML) INJECTION PRN
Status: DISCONTINUED | OUTPATIENT
Start: 2024-09-03 | End: 2024-09-03 | Stop reason: HOSPADM

## 2024-09-03 RX ADMIN — SODIUM CHLORIDE: 9 INJECTION, SOLUTION INTRAVENOUS at 11:36

## 2024-09-03 RX ADMIN — PROPOFOL 50 MG: 10 INJECTION, EMULSION INTRAVENOUS at 11:21

## 2024-09-03 RX ADMIN — PROPOFOL 50 MG: 10 INJECTION, EMULSION INTRAVENOUS at 11:32

## 2024-09-03 RX ADMIN — LIDOCAINE HYDROCHLORIDE 100 MG: 20 INJECTION, SOLUTION EPIDURAL; INFILTRATION; INTRACAUDAL; PERINEURAL at 11:20

## 2024-09-03 RX ADMIN — PROPOFOL 50 MG: 10 INJECTION, EMULSION INTRAVENOUS at 11:23

## 2024-09-03 RX ADMIN — PROPOFOL 50 MG: 10 INJECTION, EMULSION INTRAVENOUS at 11:39

## 2024-09-03 RX ADMIN — PROPOFOL 50 MG: 10 INJECTION, EMULSION INTRAVENOUS at 11:35

## 2024-09-03 RX ADMIN — PROPOFOL 50 MG: 10 INJECTION, EMULSION INTRAVENOUS at 11:44

## 2024-09-03 RX ADMIN — PROPOFOL 50 MG: 10 INJECTION, EMULSION INTRAVENOUS at 11:29

## 2024-09-03 RX ADMIN — PROPOFOL 50 MG: 10 INJECTION, EMULSION INTRAVENOUS at 11:25

## 2024-09-03 RX ADMIN — PROPOFOL 50 MG: 10 INJECTION, EMULSION INTRAVENOUS at 11:22

## 2024-09-03 RX ADMIN — PROPOFOL 50 MG: 10 INJECTION, EMULSION INTRAVENOUS at 11:41

## 2024-09-03 RX ADMIN — PROPOFOL 100 MG: 10 INJECTION, EMULSION INTRAVENOUS at 11:20

## 2024-09-03 RX ADMIN — SODIUM CHLORIDE: 9 INJECTION, SOLUTION INTRAVENOUS at 11:17

## 2024-09-03 RX ADMIN — PROPOFOL 50 MG: 10 INJECTION, EMULSION INTRAVENOUS at 11:27

## 2024-09-03 ASSESSMENT — PAIN - FUNCTIONAL ASSESSMENT: PAIN_FUNCTIONAL_ASSESSMENT: NONE - DENIES PAIN

## 2024-09-03 NOTE — ANESTHESIA POSTPROCEDURE EVALUATION
Department of Anesthesiology  Postprocedure Note    Patient: Martinez Whitaker  MRN: [id number]  YOB: 1970  Date of evaluation: 9/3/2024    Procedure Summary       Date: 09/03/24 Room / Location: [facility] 04 / [facility] ENDOSCOPY    Anesthesia Start: 1116 Anesthesia Stop: 1152    Procedures:       COLONOSCOPY (Lower GI Region)      ESOPHAGOGASTRODUODENOSCOPY (Upper GI Region) Diagnosis:       Epigastric pain      Gastroesophageal reflux disease with esophagitis, unspecified whether hemorrhage      Special screening for malignant neoplasms, colon      (Epigastric pain [R10.13])      (Gastroesophageal reflux disease with esophagitis, unspecified whether hemorrhage [K21.00])      (Special screening for malignant neoplasms, colon [Z12.11])    Surgeons: Bebo Stoll MD Responsible Provider: Louie Escobar MD    Anesthesia Type: MAC ASA Status: 2            Anesthesia Type: MAC    Carmen Phase I: Carmen Score: 10    Carmen Phase II: Carmen Score: 9    Anesthesia Post Evaluation    Patient location during evaluation: bedside  Nausea & Vomiting: no nausea  Cardiovascular status: blood pressure returned to baseline  Respiratory status: acceptable  Hydration status: euvolemic    No notable events documented.

## 2024-09-03 NOTE — ANESTHESIA PRE PROCEDURE
02/09/24 85 kg (187 lb 6.3 oz)     Body mass index is 26.9 kg/m².    CBC:   Lab Results   Component Value Date/Time    WBC 5.1 02/11/2024 03:06 AM    RBC 5.01 02/11/2024 03:06 AM    HGB 13.8 02/11/2024 03:06 AM    HCT 43.5 02/11/2024 03:06 AM    MCV 86.8 02/11/2024 03:06 AM    RDW 13.6 02/11/2024 03:06 AM     02/11/2024 03:06 AM       CMP:   Lab Results   Component Value Date/Time     02/11/2024 03:06 AM    K 4.1 02/11/2024 03:06 AM     02/11/2024 03:06 AM    CO2 26 02/11/2024 03:06 AM    BUN 9 02/11/2024 03:06 AM    CREATININE 0.76 02/11/2024 03:06 AM    LABGLOM >60 02/11/2024 03:06 AM    GLUCOSE 99 02/11/2024 03:06 AM    CALCIUM 8.4 02/11/2024 03:06 AM    BILITOT 0.6 02/09/2024 05:04 PM    ALKPHOS 103 02/09/2024 05:04 PM    AST 21 02/09/2024 05:04 PM    ALT 20 02/09/2024 05:04 PM       POC Tests: No results for input(s): \"POCGLU\", \"POCNA\", \"POCK\", \"POCCL\", \"POCBUN\", \"POCHEMO\", \"POCHCT\" in the last 72 hours.    Coags: No results found for: \"PROTIME\", \"INR\", \"APTT\"    HCG (If Applicable): No results found for: \"PREGTESTUR\", \"PREGSERUM\", \"HCG\", \"HCGQUANT\"     ABGs: No results found for: \"PHART\", \"PO2ART\", \"ISI1NDD\", \"UMD6GJF\", \"BEART\", \"W1FXOYJM\"     Type & Screen (If Applicable):  No results found for: \"LABABO\"    Drug/Infectious Status (If Applicable):  No results found for: \"HIV\", \"HEPCAB\"    COVID-19 Screening (If Applicable): No results found for: \"COVID19\"        Anesthesia Evaluation    Airway: Mallampati: II          Dental:          Pulmonary: breath sounds clear to auscultation  (+)     sleep apnea:                                  Cardiovascular:            Rhythm: regular  Rate: normal                    Neuro/Psych:               GI/Hepatic/Renal:             Endo/Other:                     Abdominal:             Vascular:          Other Findings:       Anesthesia Plan      MAC     ASA 2             Anesthetic plan and risks discussed with patient.                    Louie Escobar,

## 2024-09-03 NOTE — DISCHARGE INSTRUCTIONS
Martinez Whitaker  [id number]  1970    COLON/EGD DISCHARGE INSTRUCTIONS  Discomfort:  Redness at IV site- apply warm compress to area; if redness or soreness persist- contact your physician  There may be a slight amount of blood passed from the rectum  Gaseous discomfort- walking, belching will help relieve any discomfort  Sore throat- throat lozenges or warm salt water gargle  You may not operate a vehicle for 12 hours  You may not engage in an occupation involving machinery or appliances for rest of today  You may not drink alcoholic beverages for at least 12 hours  Avoid making any critical decisions for at least 24 hour  DIET:   Regular diet.   - however -  remember your colon is empty and a heavy meal will produce gas.   Avoid these foods:  vegetables, fried / greasy foods, carbonated drinks for today.    MEDICATIONS:      Regarding Aspirin or Nonsteroidal medications, please see below.    ACTIVITY:  You may resume your normal daily activities it is recommended that you spend the remainder of the day resting -  avoid any strenuous activity.    CALL M.D.  ANY SIGN OF:  Increasing pain, nausea, vomiting  Abdominal distension (swelling)  New increased bleeding (oral or rectal)  Fever (chills)  Pain in chest area  Bloody discharge from nose or mouth  Shortness of breath    You may not  take any Advil, Aspirin, Ibuprofen, Motrin, Aleve, or Goody’s for 10 days, ONLY  Tylenol as needed for pain.    Post procedure diagnosis: Gastritis, esophagitis  Post-procedure recommendations: Take Omeprazole as prescribed    Follow-up Instructions:   Call Dr. Stoll  Results of procedure / biopsy in 10 days  Telephone #  [phone]      DISCHARGE SUMMARY from Nurse    The following personal items collected during your admission are returned to you:   Dental Appliance:    Vision:    Hearing Aid:    Jewelry:    Clothing:    Other Valuables:    Valuables sent to safe: Dose (mL/hr) Propofol : *50 mg

## 2024-09-03 NOTE — OP NOTE
[facility]  [address]  [place] [address] [phone]                           Colonoscopy and EGD Procedure Note      Indications:   Epigastric pain, GERD, colon cancer screening      :  Bebo Stoll MD    Staff: Circulator: Josi Barragan RN  Endoscopy Technician: Yefri Alfaro    Referring Provider: Cindy Medina MD    Sedation:  MAC anesthesia Propofol    Procedure Details:  After informed consent was obtained with all risks and benefits of procedure explained and pre-operative exam completed, pt was placed in the left lateral decubitus position. Following sequential administration of sedation as per above, the gastroscope was inserted into the mouth and advanced under direct vision to second portion of the duodenum.  A careful inspection was made as the gastroscope was withdrawn, including a retroflexed view of the proximal stomach; findings and interventions are described below.      EGD Findings:  Esophagus:Grade 1 esophagitis at GE junction, biopsies done.  Stomach: Patchy antral erythema and erosions noted, biopsies done.  Duodenum/jejunum:normal    EGD Interventions:  none    The bed was then turned and upon sequential sedation as per above, a digital rectal exam was performed per below. The Olympus videocolonoscope was inserted in the rectum and carefully advanced to the cecum, which was identified by the ileocecal valve and appendiceal orifice.  The quality of preparation was fair.  The colonoscope was slowly withdrawn with careful evaluation between folds. Retroflexion in the rectum was performed.     Colon Findings:   Rectum: normal  Sigmoid: normal  Descending Colon: normal  Transverse Colon: normal  Ascending Colon: normal  Cecum: normal  Terminal Ileum: normal    Colonoscopy Interventions:  none           Specimens Removed:    ID Type Source Tests Collected by Time Destination   1 : gastric antrum bx r/o H pylori Tissue Gastric SURGICAL

## 2024-09-03 NOTE — H&P
Pre-endoscopy H and P for Colonoscopy    The patient was seen and examined.Date of last colonoscopy: none, Polyps  No      The airway was assessed and documented.  The problem list, past medical history, and medications were reviewed.     Patient Active Problem List   Diagnosis    SBO (small bowel obstruction) (HCC)     Social History     Socioeconomic History    Marital status:      Spouse name: Not on file    Number of children: Not on file    Years of education: Not on file    Highest education level: Not on file   Occupational History    Not on file   Tobacco Use    Smoking status: Never    Smokeless tobacco: Never   Substance and Sexual Activity    Alcohol use: Yes    Drug use: No    Sexual activity: Not on file   Other Topics Concern    Not on file   Social History Narrative    Not on file     Social Determinants of Health     Financial Resource Strain: Not on file   Food Insecurity: No Food Insecurity (2/10/2024)    Hunger Vital Sign     Worried About Running Out of Food in the Last Year: Never true     Ran Out of Food in the Last Year: Never true   Transportation Needs: No Transportation Needs (2/10/2024)    PRAPARE - Transportation     Lack of Transportation (Medical): No     Lack of Transportation (Non-Medical): No   Physical Activity: Not on file   Stress: Not on file   Social Connections: Not on file   Intimate Partner Violence: Not on file   Housing Stability: Low Risk  (2/10/2024)    Housing Stability Vital Sign     Unable to Pay for Housing in the Last Year: No     Number of Places Lived in the Last Year: 1     Unstable Housing in the Last Year: No     Past Medical History:   Diagnosis Date    Sleep apnea treated with continuous positive airway pressure (CPAP)      The patient has a family history of NA    Prior to Admission Medications   Prescriptions Last Dose Informant Patient Reported? Taking?   albuterol sulfate HFA (PROVENTIL HFA) 108 (90 Base) MCG/ACT inhaler Not Taking  Yes No   Si

## 2024-09-03 NOTE — PROGRESS NOTES
Initial RN admission and assessment performed and documented in Endoscopy navigator.     Patient evaluated by anesthesia in pre-procedure holding.     All procedural vital signs, airway assessment, and level of consciousness information monitored and recorded by anesthesia staff on the anesthesia record.     Report received from CRNA post procedure.  Patient transported to recovery area by RN.    Endoscopy post procedure time out was performed and specimens were verified by physician.    Endoscope was pre-cleaned at bedside immediately following procedure by Bella.

## (undated) DEVICE — FORCEP BX JUMBO 2.8 MMX240 CM ORNG RADIAL JAW 4 M00513363

## (undated) DEVICE — ORISE PROKNIFE 3.0 MM ELECTRODE: Brand: ORISE™ PROKNIFE

## (undated) DEVICE — ORISE PROKNIFE 1.5 MM ELECTRODE: Brand: ORISE™ PROKNIFE

## (undated) DEVICE — SUPPLEMENT DIGESTIVE H2O SOL GI-EASE